# Patient Record
Sex: FEMALE | Race: WHITE | Employment: FULL TIME | URBAN - METROPOLITAN AREA
[De-identification: names, ages, dates, MRNs, and addresses within clinical notes are randomized per-mention and may not be internally consistent; named-entity substitution may affect disease eponyms.]

---
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
## 2021-04-18 ENCOUNTER — APPOINTMENT (OUTPATIENT)
Dept: GENERAL RADIOLOGY | Facility: CLINIC | Age: 21
End: 2021-04-18
Payer: COMMERCIAL

## 2021-04-18 ENCOUNTER — HOSPITAL ENCOUNTER (EMERGENCY)
Facility: CLINIC | Age: 21
Discharge: HOME | End: 2021-04-18
Attending: EMERGENCY MEDICINE
Payer: COMMERCIAL

## 2021-04-18 VITALS
HEART RATE: 110 BPM | RESPIRATION RATE: 20 BRPM | DIASTOLIC BLOOD PRESSURE: 78 MMHG | SYSTOLIC BLOOD PRESSURE: 135 MMHG | OXYGEN SATURATION: 99 % | HEIGHT: 65 IN | BODY MASS INDEX: 37.15 KG/M2 | WEIGHT: 223 LBS | TEMPERATURE: 100.5 F

## 2021-04-18 DIAGNOSIS — B34.9 VIRAL ILLNESS: Primary | ICD-10-CM

## 2021-04-18 PROCEDURE — 99284 EMERGENCY DEPT VISIT MOD MDM: CPT

## 2021-04-18 PROCEDURE — 71045 X-RAY EXAM CHEST 1 VIEW: CPT

## 2021-04-18 PROCEDURE — 6370000000 HC RX 637 (ALT 250 FOR IP): Performed by: EMERGENCY MEDICINE

## 2021-04-18 RX ORDER — CITALOPRAM 10 MG/1
10 TABLET ORAL DAILY
COMMUNITY

## 2021-04-18 RX ORDER — IBUPROFEN 800 MG/1
800 TABLET ORAL ONCE
Status: COMPLETED | OUTPATIENT
Start: 2021-04-18 | End: 2021-04-18

## 2021-04-18 RX ADMIN — IBUPROFEN 800 MG: 800 TABLET, FILM COATED ORAL at 21:16

## 2021-04-18 ASSESSMENT — PAIN SCALES - GENERAL: PAINLEVEL_OUTOF10: 3

## 2021-04-19 NOTE — ED PROVIDER NOTES
eMERGENCY dEPARTMENT eNCOUnter      Pt Name: Audra Sparks  MRN: [id number]  [place] 2000  Date of evaluation: 4/18/2021      CHIEF COMPLAINT       Chief Complaint   Patient presents with    Dizziness    Cough    URI    Fever    Pharyngitis         HISTORY OF PRESENT ILLNESS    Audra Sparks is a 23 y.o. female who presents multiple complaints patient states since today she has been having a dry cough lightheaded feverish sore throat body aches diarrhea no contacts that she is aware of she has not had immunizations for Covid no chest pain        REVIEW OF SYSTEMS       Review of systems are all reviewed and negative except stated above in HPI    [address]    has no past medical history on file. SURGICAL HISTORY      has a past surgical history that includes Eye surgery. CURRENT MEDICATIONS       Previous Medications    ACETAMINOPHEN-CODEINE 120-12 MG/5ML SOLUTION    Take 5-10 mLs by mouth every 6 hours as needed for Pain (Cough)    ASCORBIC ACID (VITAMIN C) 500 MG TABLET    Take 500 mg by mouth as needed    CITALOPRAM (CELEXA) 10 MG TABLET    Take 10 mg by mouth daily    FERROUS SULFATE 325 (65 FE) MG TABLET    Take 325 mg by mouth as needed       ALLERGIES     has No Known Allergies. FAMILY HISTORY     has no family status information on file. family history is not on file. SOCIAL HISTORY      reports that she has been smoking cigarettes. She has been smoking about 0.50 packs per day. She has never used smokeless tobacco. She reports current alcohol use. She reports that she does not use drugs. PHYSICAL EXAM     INITIAL VITALS:  height is 5' 5\" (1.651 m) and weight is 101.2 kg (223 lb). Her temperature is 100.5 °F (38.1 °C). Her blood pressure is 135/78 and her pulse is 110. Her respiration is 20 and oxygen saturation is 99%.       General: Patient alert nontoxic-appearing obese female in no apparent distress  HEENT: Head is atraumatic conjunctiva are clear oromucosa is pink and moist posterior pharynx without erythema exudate or airway compromise handling secretions well  Neck: Supple no meningismus or lymphadenopathy  Respiratory: Lung sounds are clear bilateral  Cardiac: Heart is mildly tachycardic without murmur  GI: Abdomen is obese soft nontender bowel sounds are normal  Peripheral exam no cyanosis or edema    DIFFERENTIAL DIAGNOSIS/ MDM:     Treat fever chest x-ray    DIAGNOSTIC RESULTS     EKG: All EKG's are interpreted by the Emergency Department Physician who either signs or Co-signs this chart in the absence of a cardiologist.        RADIOLOGY:   I directly visualized the following  images and reviewed the radiologist interpretations:  XR CHEST PORTABLE   Final Result   No acute process. LABS:  Labs Reviewed - No data to display      EMERGENCY DEPARTMENT COURSE:   Vitals:    Vitals:    04/18/21 2028 04/18/21 2032   BP:  135/78   Pulse: 110    Resp: 20    Temp: 100.5 °F (38.1 °C)    SpO2: 99%    Weight:  101.2 kg (223 lb)   Height:  5' 5\" (1.651 m)     -------------------------  BP: 135/78, Temp: 100.5 °F (38.1 °C), Pulse: 110, Resp: 20    Orders Placed This Encounter   Medications    ibuprofen (ADVIL;MOTRIN) tablet 800 mg           Re-evaluation Notes    Chest x-ray is negative patient sats are normal no indication of acute PE no pneumonia is findings are most consistent with viral etiology we did discuss Covid with quarantining outpatient testing return if worse    CRITICAL CARE:   None      CONSULTS:      PROCEDURES:  None    FINAL IMPRESSION      1.  Viral illness          DISPOSITION/PLAN   DISPOSITION Decision To Discharge 04/18/2021 09:35:22 PM      Condition on Disposition    Stable    PATIENT REFERRED TO:  Chuy Ibrahim MD  [address] [place] [address]  [phone]            DISCHARGE MEDICATIONS:  New Prescriptions    No medications on file       (Please note that portions of this note were completed with a voice recognition program.  Efforts were made to edit the dictations but occasionally words are mis-transcribed.)    Lloyd MD, F.A.C.E.P.   Attending Emergency Physician        Kasey Smith MD  04/18/21 2135

## 2021-04-19 NOTE — ED NOTES
Pt presents to the ED for cough, fever, myalgias and sore throat x1 day.  Pt works at an [employer] and is unsure of MatthOxynadeport exposure        [facility]  49/62/07 [id number]

## 2021-10-27 ENCOUNTER — APPOINTMENT (OUTPATIENT)
Dept: CT IMAGING | Age: 21
End: 2021-10-27
Payer: COMMERCIAL

## 2021-10-27 ENCOUNTER — HOSPITAL ENCOUNTER (EMERGENCY)
Age: 21
Discharge: HOME | End: 2021-10-27
Attending: EMERGENCY MEDICINE
Payer: COMMERCIAL

## 2021-10-27 VITALS
RESPIRATION RATE: 16 BRPM | DIASTOLIC BLOOD PRESSURE: 67 MMHG | HEART RATE: 96 BPM | SYSTOLIC BLOOD PRESSURE: 122 MMHG | OXYGEN SATURATION: 97 % | TEMPERATURE: 98.5 F

## 2021-10-27 DIAGNOSIS — S09.90XA CLOSED HEAD INJURY, INITIAL ENCOUNTER: ICD-10-CM

## 2021-10-27 DIAGNOSIS — S06.0X0A CONCUSSION WITHOUT LOSS OF CONSCIOUSNESS, INITIAL ENCOUNTER: Primary | ICD-10-CM

## 2021-10-27 PROCEDURE — 99283 EMERGENCY DEPT VISIT LOW MDM: CPT

## 2021-10-27 PROCEDURE — 70450 CT HEAD/BRAIN W/O DYE: CPT

## 2021-10-27 ASSESSMENT — VISUAL ACUITY: OU: 1

## 2021-10-27 NOTE — ED PROVIDER NOTES
[address] ED  eMERGENCY dEPARTMENT eNCOUnter      Pt Name: Tara Alvarez  MRN: [id number]  [place] 2000  Date of evaluation: 10/27/21      CHIEF COMPLAINT:   Chief Complaint   Patient presents with    Head Injury     on [address]    Tara Alvarez is a 19 y.o. female who presents with complaints of head injury. Patient states on Saturday she was at work when she bent over and then stood back up to put a package in a car and hit her head on the metal cart. Patient states she struck the right side of her head. She denies loss of consciousness or emesis but states she immediately felt disoriented and nauseated. Patient states since the incident she has continued to have a headache, nausea, lightheadedness, feeling quotations \"off. \"  Denies any syncope, vomiting, abdominal pain, chest pain, shortness of breath, back pain, numbness or tingling. She is not on any blood thinners. No other complaints. REVIEW OF SYSTEMS     headache, nausea, lightheadedness, head injury    Negative in 10 essential Systems except as mentioned above and in the HPI. PAST MEDICAL HISTORY   PMH:  has no past medical history on file. none otherwise stated from nurses notes  Surgical History:  has a past surgical history that includes Eye surgery. none otherwise stated from nurses notes  Social History:  reports that she has been smoking cigarettes. She has been smoking about 0.50 packs per day. She has never used smokeless tobacco. She reports current alcohol use. She reports that she does not use drugs. , lives at home with others  Family History: none  Psychiatric History: none    Allergies:has No Known Allergies. PHYSICAL EXAM     INITIAL VITALS: /67   Pulse 96   Temp 98.5 °F (36.9 °C) (Oral)   Resp 16   SpO2 97%   Physical Exam  Vitals and nursing note reviewed. Constitutional:       Appearance: Normal appearance.  She is well-developed, well-groomed and normal weight. HENT:      Head: Normocephalic. No raccoon eyes, Marx's sign, abrasion, contusion, right periorbital erythema or left periorbital erythema. Nose: Nose normal.   Eyes:      General: Lids are normal. Vision grossly intact. Gaze aligned appropriately. No visual field deficit. Extraocular Movements: Extraocular movements intact. Conjunctiva/sclera: Conjunctivae normal.      Pupils: Pupils are equal, round, and reactive to light. Cardiovascular:      Rate and Rhythm: Normal rate and regular rhythm. Pulses: Normal pulses. Heart sounds: Normal heart sounds, S1 normal and S2 normal.   Pulmonary:      Effort: Pulmonary effort is normal.      Breath sounds: Normal breath sounds and air entry. Abdominal:      Tenderness: There is no abdominal tenderness. There is no guarding. Hernia: No hernia is present. Musculoskeletal:      Cervical back: Normal and full passive range of motion without pain. No spinous process tenderness or muscular tenderness. Thoracic back: Normal.      Lumbar back: Normal.   Skin:     General: Skin is warm. Capillary Refill: Capillary refill takes less than 2 seconds. Neurological:      General: No focal deficit present. Mental Status: She is alert and oriented to person, place, and time. Mental status is at baseline. Cranial Nerves: Cranial nerves are intact. No cranial nerve deficit, dysarthria or facial asymmetry. Sensory: Sensation is intact. No sensory deficit. Motor: Motor function is intact. No weakness, tremor, atrophy, abnormal muscle tone, seizure activity or pronator drift. Coordination: Coordination is intact. Romberg sign negative. Coordination normal. Finger-Nose-Finger Test normal. Rapid alternating movements normal.      Gait: Gait is intact. Gait normal.   Psychiatric:         Behavior: Behavior is cooperative.            Charlotte Coma Scale*  Patient characteristics Points   Eyes open   Spontaneously 4 To speech 3   To pain 2   Never 1   Best verbal response   Oriented 5   Confused 4   Inappropriate words 3   Incomprehensible sounds 2   Silent 1   Best motor response   Obeys commands 6   Localizes pain 5   Flexion withdrawal 4   Decerebrate flexion 3   Decerebrate extension 2   No response 1   Total 15         EMERGENCY DEPARTMENT COURSE:     No orders of the defined types were placed in this encounter. Head injury on Saturday while at work. Reports headache, lightheadedness, nausea. No neurological deficits. Suspect concussion. Did discuss ct head vs conservative management. She would like a head ct. Ct head is unremarkable. Suspect concussion. Concussion care instructions dicussed with patient. Recommend no physical activity. Follow up with PCP>   Discussed results and plan with the pt. They expressed appropriate understanding. Pt given close follow up, supportive care instructions and strict return instructions at the bedside. Differential diagnosis includes but is not limited to subarachnoid hemorrhage, subdural hemorrhage, skull fracture, concussion, contusion    The patient has been instructed to return if the symptoms worsen or change in any way. The patient verbalized understanding. [facility] - Emergency Medicine: Utilization of CT for Minor Blunt Head Trauma (Adult)   [ ] Patient is 25 or older, presenting with minor blunt head trauma. Head CT (including cosigned orders) was ordered by an emergency care clinician for trauma because (select one or more): [SATISFIES MIPS PERFORMANCE]  Reasons:  [ ] Patient is 72 or older  [ ] Patient GCS < 13  [ ] Patient has focal neurologic deficit  [ X] Patient has severe headache        FINAL IMPRESSION:     1. Concussion without loss of consciousness, initial encounter    2.  Closed head injury, initial encounter          DISPOSITION:  DISPOSITION       PATIENT REFERRED TO:  Danish Thakkar MD  [address] [place] [address] [address] [facility] ED  [facility] [place] [address]  [phone]          DISCHARGE MEDICATIONS:  New Prescriptions    No medications on file       (Please note that portions of this note were completed with a voice recognition program.  Efforts were made to edit the dictations but occasionally words are mis-transcribed.)       Aicha Castaneda PA-C  10/27/21 8542

## 2021-10-27 NOTE — ED TRIAGE NOTES
Mode of arrival (squad #, walk in, police, etc) : walk in        Chief complaint(s): head injury        Arrival Note (brief scenario, treatment PTA, etc). : Pt states she hit her head on a metal cart on Saturday. C= \"Have you ever felt that you should Cut down on your drinking? \"  No  A= \"Have people Annoyed you by criticizing your drinking? \"  No  G= \"Have you ever felt bad or Guilty about your drinking? \"  No  E= \"Have you ever had a drink as an Eye-opener first thing in the morning to steady your nerves or to help a hangover? \"  No      Deferred []      Reason for deferring: N/A    *If yes to two or more: probable alcohol abuse. *

## 2021-10-28 NOTE — ED PROVIDER NOTES
[address] ED  eMERGENCY dEPARTMENT eNCOUnter   Independent Attestation     Pt Name: Gill Ennis  MRN: [id number]  [place] 2000  Date of evaluation: 10/27/21   Gill Ennis is a 19 y.o. female who presents with Head Injury (on saturday)    Vitals:   Vitals:    10/27/21 1531   BP: 122/67   Pulse: 96   Resp: 16   Temp: 98.5 °F (36.9 °C)   TempSrc: Oral   SpO2: 97%     Impression:   1. Concussion without loss of consciousness, initial encounter    2. Closed head injury, initial encounter      I was personally available for consultation in the Emergency Department. I have reviewed the chart and agree with the documentation as recorded by the [facility], including the assessment, treatment plan and disposition.   Sammie Reece MD  Attending Emergency  Physician                  Sammie Reece MD  10/28/21 7127

## 2021-11-04 ENCOUNTER — HOSPITAL ENCOUNTER (EMERGENCY)
Facility: CLINIC | Age: 21
Discharge: HOME | End: 2021-11-04
Attending: EMERGENCY MEDICINE
Payer: COMMERCIAL

## 2021-11-04 ENCOUNTER — APPOINTMENT (OUTPATIENT)
Dept: GENERAL RADIOLOGY | Facility: CLINIC | Age: 21
End: 2021-11-04
Payer: COMMERCIAL

## 2021-11-04 VITALS
RESPIRATION RATE: 16 BRPM | WEIGHT: 212 LBS | TEMPERATURE: 98 F | DIASTOLIC BLOOD PRESSURE: 76 MMHG | OXYGEN SATURATION: 99 % | HEART RATE: 79 BPM | SYSTOLIC BLOOD PRESSURE: 120 MMHG | BODY MASS INDEX: 34.07 KG/M2 | HEIGHT: 66 IN

## 2021-11-04 DIAGNOSIS — R07.89 MUSCULOSKELETAL CHEST PAIN: Primary | ICD-10-CM

## 2021-11-04 DIAGNOSIS — R07.9 CHEST PAIN, UNSPECIFIED TYPE: ICD-10-CM

## 2021-11-04 PROCEDURE — 99283 EMERGENCY DEPT VISIT LOW MDM: CPT

## 2021-11-04 PROCEDURE — 71045 X-RAY EXAM CHEST 1 VIEW: CPT

## 2021-11-04 PROCEDURE — 93005 ELECTROCARDIOGRAM TRACING: CPT

## 2021-11-04 PROCEDURE — 6370000000 HC RX 637 (ALT 250 FOR IP)

## 2021-11-04 RX ORDER — ONDANSETRON 4 MG/1
4 TABLET, ORALLY DISINTEGRATING ORAL EVERY 8 HOURS PRN
Qty: 10 TABLET | Refills: 0 | Status: SHIPPED | OUTPATIENT
Start: 2021-11-04

## 2021-11-04 RX ORDER — ONDANSETRON 4 MG/1
4 TABLET, ORALLY DISINTEGRATING ORAL ONCE
Status: COMPLETED | OUTPATIENT
Start: 2021-11-04 | End: 2021-11-04

## 2021-11-04 RX ORDER — IBUPROFEN 800 MG/1
800 TABLET ORAL 3 TIMES DAILY PRN
Qty: 20 TABLET | Refills: 0 | Status: SHIPPED | OUTPATIENT
Start: 2021-11-04

## 2021-11-04 RX ADMIN — ONDANSETRON 4 MG: 4 TABLET, ORALLY DISINTEGRATING ORAL at 12:01

## 2021-11-04 ASSESSMENT — PAIN DESCRIPTION - PAIN TYPE: TYPE: ACUTE PAIN

## 2021-11-04 ASSESSMENT — PAIN DESCRIPTION - LOCATION: LOCATION: CHEST

## 2021-11-04 ASSESSMENT — PAIN DESCRIPTION - ONSET: ONSET: ON-GOING

## 2021-11-04 ASSESSMENT — PAIN DESCRIPTION - ORIENTATION: ORIENTATION: MID

## 2021-11-04 ASSESSMENT — PAIN DESCRIPTION - DESCRIPTORS: DESCRIPTORS: SHARP;STABBING

## 2021-11-04 ASSESSMENT — PAIN DESCRIPTION - PROGRESSION: CLINICAL_PROGRESSION: NOT CHANGED

## 2021-11-04 ASSESSMENT — HEART SCORE: ECG: 0

## 2021-11-04 ASSESSMENT — PAIN SCALES - GENERAL: PAINLEVEL_OUTOF10: 8

## 2021-11-04 ASSESSMENT — PAIN DESCRIPTION - FREQUENCY: FREQUENCY: CONTINUOUS

## 2021-11-04 NOTE — ED PROVIDER NOTES
[facility]  [address] [facility]  Phone: [phone]        Pt Name: Figueroa Ramirez  MRN: [id number]  [place] 2000  Date of evaluation: 11/4/21    CHIEFCOMPLAINT       Chief Complaint   Patient presents with    Chest Pain    Shortness of Breath       HISTORY OF PRESENT ILLNESS (Location/Symptom, Timing/Onset, Context/Setting, Quality, Duration, Modifying Factors, Severity)      Figueroa Ramirez is a 19 y.o. female with no pertinent PMH who presents to the ED via private auto with complaints of midsternal chest pain that radiates to the left breast.  She states that the pain started last night while she was driving and has been constant. She reports that she is unable to take a deep breath related to the pain and feels short of breath because of this. She describes the pain as sharp. She also complains of constant sharp epigastric abdominal pain that started this morning that woke her up from sleep with nausea. She denies any cough, congestion, fever, vomiting, diarrhea or constipation. She denies taking anything prior to arrival for the pain or nausea. She states that she has had this same pain before although she is here today because it has not been this \"bad\" in the past.     PAST MEDICAL / SURGICAL / SOCIAL / FAMILY HISTORY     PMH:  has no past medical history on file. Surgical History:  has a past surgical history that includes Eye surgery. Social History:  reports that she has been smoking cigarettes. She has been smoking about 0.50 packs per day. She has never used smokeless tobacco. She reports current alcohol use. She reports that she does not use drugs. Family History: has no family status information on file. family history is not on file. Psychiatric History: None    Allergies: Patient has no known allergies. Home Medications:   Prior to Admission medications    Medication Sig Start Date End Date Taking?  Authorizing Provider   ondansetron (ZOFRAN ODT) 4 MG disintegrating tablet Take 1 tablet by mouth every 8 hours as needed for Nausea or Vomiting 11/4/21  Yes Jennifer Garcia APRN - CNP   ibuprofen (ADVIL;MOTRIN) 800 MG tablet Take 1 tablet by mouth 3 times daily as needed for Pain 11/4/21  Yes Jennifer Garcia APRN - CNP   citalopram (CELEXA) 10 MG tablet Take 10 mg by mouth daily    Historical Provider, MD   Ascorbic Acid (VITAMIN C) 500 MG tablet Take 500 mg by mouth as needed    Historical Provider, MD   ferrous sulfate 325 (65 FE) MG tablet Take 325 mg by mouth as needed    Historical Provider, MD   acetaminophen-codeine 120-12 MG/5ML solution Take 5-10 mLs by mouth every 6 hours as needed for Pain (Cough) 11/12/15   Rubin Castro MD       REVIEW OF SYSTEMS  (2-9 systems for level 4, 10 ormore for level 5)      Review of Systems    Constitutional: Denies fever or chills. Eyes: Denies vision changes. HENT: Denies sore throat, nasal congestion, or neck pain. Respiratory: Reports feeling short of breath because she is unable to take a deep breath related to pain. Denies cough. Cardiovascular: Sharp midsternal to left-sided chest pain increased with deep inspiration and palpation. GI: Complaining of sharp epigastric abdominal pain with nausea, denies vomiting, constipation or diarrhea. : Denies painful urination. Musculoskeletal: Denies recent trauma. Skin: Denies new rashes or wounds. All other systems negative except as marked. PHYSICAL EXAM  (up to 7 for level 4, 8 or more for level 5)      INITIAL VITALS:  height is 5' 6\" (1.676 m) and weight is 96.2 kg (212 lb). Her oral temperature is 98 °F (36.7 °C). Her blood pressure is 120/76 and her pulse is 79. Her respiration is 16 and oxygen saturation is 99%. Vital signs reviewed. Physical Exam    General:  Alert, cooperative, well-groomed, well-nourished, appears stated age, and is in no acute distress. Head:  Normocephalic, atraumatic, and without obvious abnormality.    Eyes: Sclerae/conjunctivae clear without injection, pallor, or icterus. Corneas clear without opacities. EOM's intact. ENT: Ears and nose are all without obvious masses lesion or deformity. No oropharynx examination performed due to aerosolization risk during COVID-19 pandemic. Neck: Supple and symmetrical. Trachea midline. No adenopathy. No jugular venous distention. Lungs:   No respiratory distress. Clear to auscultation bilaterally. No wheezes, rhonchi, or rales. Heart:  Regular rate. Regular rhythm. No murmurs, rubs, or gallops. Pain is reproducible to sternum. Abdomen:   Normoactive bowel sounds. Soft, tenderness noted over epigastric area, nondistended without guarding or rebound. No palpable masses. No CVA tenderness. Extremities: Warm and dry without erythema or edema. Skin: Soft, good turgor, and well-hydrated. No obvious rashes or lesions. Neurologic: GCS is 15 and no focal deficits are appreciated. Normal gait. Grossly normal motor and sensation. Speech clear. DIFFERENTIAL DIAGNOSIS / MDM   The patient presents with chest pain that is not suggesting in nature of pulmonary embolus, aortic dissection, cardiac ischemia, or other serious etiology. I considered an aortic dissection, but this is unlikely as patient is not complaining of tearing or ripping chest pain that is radiating to the back, the patient has no new neurological abnormalities and pulses are equal to all extremities. Mediastinum is within normal limits. Patient appears comfortable on physical exam and is not in distress. I also thought about a cardiac tamponade, but this is unlikely as patient is hemodynamically stable. Heart sounds are not distant, EKG does not show signs of electrical alternans and there is no JVD. I also thought about a tension pneumothorax, but this is unlikely given bilateral breath sounds and no signs of hemodynamic instability. I do not feel the patient has a PE. No clinical evidence of DVT.   I thought about an esophageal perforation, but history and physical exam does not suggest vomiting, followed by chest pain. No signs of Hamman's crunch on physical exam; again, patient appears comfortable and is well appearing and non toxic. The patient has been instructed to return if the symptpoms change or worsen in any way. Given the extremely low risk of these diagnoses further testing and evaluation for these possibilites are not indicated at this time. The patient appears stable for discharge and has been instructed to return immediately if the symptoms worsen in any way, or in 8-12 hr if not improved for re-evaluation. We also discussed returning to the Emergency Department immediately if new or worsening symptoms occur. We have discussed the symptoms which are most concerning (e.g., worsening pain, shortness of breath, a feeling of passing out, fever, any neurologic symptoms, abdominal pain or vomiting) that necessitate immediate return. The patient understands that at this time there is no evidence for a more malignant underlying process, but the patient also understands that early in the process of an illness or injury, an emergency department workup can be falsely reassuring. Routine discharge counseling was given, and the patient understands that worsening, changing or persistent symptoms should prompt an immediate call or follow up with their primary physician or return to the emergency department. The importance of appropriate follow up was also discussed. I have reviewed the disposition diagnosis with the patient and or their family/guardian. I have answered their questions and given discharge instructions. They voiced understanding of these instructions and did not have any further questions or complaints.     PLAN (LABS / IMAGING / EKG):  Orders Placed This Encounter   Procedures    XR CHEST PORTABLE    EKG 12 Lead       MEDICATIONS ORDERED:  Orders Placed This Encounter   Medications    Emergency Medicine Physician Assistant    (Please note that portions of this note were completed with a voice recognition program.  Efforts were made to edit the dictations but occasionally words aremis-transcribed.)     EVELINA Uribe - CNP  11/04/21 0316

## 2021-11-04 NOTE — ED NOTES
Patient to ED via [facility] ambulatory to room 12  Patient here for complaint of chest pain and shortness of breath that started last night around 2100 when she was driving  Patient describes pain as sharp and stabbing in the middle of her chest radiating to her left side  Patient states she is also having shortness of breath with this chest pain  Patient denies a hx of cardiac related issues  Patient presents a+ox4 in NAD, resting comftortably  Vitals obtained and call light provided  Patient resting comfortably on stretcher in no apparent distress  Respirations even and non-labored  No other needs at this time  EKG obtained       Salena Ortez RN  11/04/21 7005

## 2021-11-04 NOTE — Clinical Note
Stefanie Rose was seen and treated in our emergency department on 11/4/2021. She may return to work on 11/06/2021. If you have any questions or concerns, please don't hesitate to call.       Caity Grace, EVELINA - CNP

## 2021-11-04 NOTE — ED PROVIDER NOTES
w          [facility] ED  [address] [place] [address]  Phone: [phone]      Attending Physician [address]       Chief Complaint   Patient presents with    Chest Pain    Shortness of Breath       DIAGNOSTIC RESULTS     LABS:  Labs Reviewed - No data to display    All other labs were within normal range or not returned as of this dictation. RADIOLOGY:  XR CHEST PORTABLE   Final Result   Unremarkable chest.               EMERGENCY DEPARTMENT COURSE:   Vitals:    Vitals:    11/04/21 1129   BP: 120/76   Pulse: 79   Resp: 16   Temp: 98 °F (36.7 °C)   TempSrc: Oral   SpO2: 99%   Weight: 96.2 kg (212 lb)   Height: 5' 6\" (1.676 m)     -------------------------  BP: 120/76, Temp: 98 °F (36.7 °C), Pulse: 79, Resp: 16             PERTINENT ATTENDING PHYSICIAN COMMENTS:    I performed a history and physical examination of the patient and discussed management with the mid level provider. I reviewed the mid level provider's note and agree with the documented findings and plan of care. Any areas of disagreement are noted on the chart. I was personally present for the key portions of any procedures. I have documented in the chart those procedures where I was not present during the key portions. I have reviewed the emergency nurses triage note. I agree with the chief complaint, past medical history, past surgical history, allergies, medications, social and family history as documented unless otherwise noted below. Documentation of the HPI, Physical Exam and Medical Decision Making performed by mid level providers is based on my personal performance of the HPI, PE and MDM. For Physician Assistant/ Nurse Practitioner cases/documentation I have personally evaluated this patient and have completed at least one if not all key elements of the E/M (history, physical exam, and MDM). Additional findings are as noted.     Patient EKG and chest x-ray are unremarkable, she has no significant comorbidities, based on Pulmonary Embolism Rule Out Criteria [facility]), which the patient meets, this patient has low enough risk for PE that I do not think this is a diagnosis that needs further pursuit at this time. Very low suspicion for an acute coronary syndrome. Strongly suspect musculoskeletal chest pain and possibly even an anxiety component.       (Please note that portions of this note were completed with a voice recognition program.  Efforts were made to edit the dictations but occasionally words are mis-transcribed.)    Micky Hayden DO  Attending Emergency Medicine Physician       Micky Hayden DO  11/04/21 1705

## 2021-11-05 LAB
EKG ATRIAL RATE: 73 BPM
EKG P AXIS: 28 DEGREES
EKG P-R INTERVAL: 120 MS
EKG Q-T INTERVAL: 380 MS
EKG QRS DURATION: 88 MS
EKG QTC CALCULATION (BAZETT): 418 MS
EKG R AXIS: 30 DEGREES
EKG T AXIS: 25 DEGREES
EKG VENTRICULAR RATE: 73 BPM